# Patient Record
(demographics unavailable — no encounter records)

---

## 2024-10-14 NOTE — HISTORY OF PRESENT ILLNESS
[de-identified] : c/o problems with nosebleeds - problem for about 3 years - worse in fall.   Last episode one week ago.  usually right side.   Occ heavy.  Hx thalassemia minor.  No ASA of blood thinners.  (Beta thalaseemia )

## 2024-10-14 NOTE — REVIEW OF SYSTEMS
[As Noted in HPI] : as noted in HPI [Nose Bleeds] : nose bleeds [Nasal Congestion] : no nasal congestion [Recurrent Sinus Infections] : no recurrent sinus infections [Problem Snoring] : no snoring problems [Sinus Pain] : no sinus pain [Sinus Pressure] : no sinus pressure [Sense Of Smell Problem] : no sense of smell problem [Discolored Nasal Discharge] : no discolored nasal discharge [Snoring With Pauses] : no snoring with pauses [de-identified] : nose bleeds

## 2024-10-14 NOTE — REASON FOR VISIT
[Initial Consultation] : an initial consultation for [Parent] : parent [Other: _____] : [unfilled] [FreeTextEntry2] : nose bleeds

## 2024-10-14 NOTE — ASSESSMENT
[FreeTextEntry1] : Patient with occ epistaxis - problem for about 3 years - last episode one week ago.  Usually right but can be either.  No bleeding site seen but does have nasal crusting. Recommended mupirocin ointment- instructed in use - also saline nasal spray and cool mist humidifier. follow up as needed.

## 2024-10-14 NOTE — CONSULT LETTER
[Dear  ___] : Dear  [unfilled], [Consult Letter:] : I had the pleasure of evaluating your patient, [unfilled]. [Please see my note below.] : Please see my note below. [Consult Closing:] : Thank you very much for allowing me to participate in the care of this patient.  If you have any questions, please do not hesitate to contact me. [FreeTextEntry3] : Sincerely,  Rojelio Avila MD., FACS

## 2025-02-18 NOTE — HISTORY OF PRESENT ILLNESS
[No Feeding Issues] : no feeding issues at this time [Solid Foods] : eating solid foods [de-identified] : Uriel Alaniz (: 2012) is an 12-year-old male patient with PMH significant for murmur and atrial premature complex (Following with Cardiology since 2013) presenting to the clinic today for concerns related to Beta Thalassemia Minor. On 2022, Uriel's CBC displayed an elevated RBC count to 5.88 (Elevated), therapeutic HGB of 11.6 g/dl, RBC microcytosis with an MCV of 64.1, MCH of 19.7 and RDW of 17.8. HGB electrophoresis confirmed Beta Thalassemia Minor. Iron studies not performed at this time, but lead level displayed as therapeutic. Uriel's PCP is Kamilah Larsen.  [de-identified] : Family denies fevers, URI symptoms, N/V/D, constipation, neurological concerns, active bleeding, active jaundice, ecchymosis, and petechiae. Adequate intake and output.  Adequate activity level with no noted signs of increased lethargy or fatigue. No noted signs of pain or distress.   [de-identified] : Cow's milk only in cereal

## 2025-02-18 NOTE — REASON FOR VISIT
[Follow-Up Visit] : a follow-up visit for [Anemia] : anemia [Family Member] : family member [Patient] : patient [Mother] : mother [Medical Records] : medical records [Other: _____] : [unfilled] [FreeTextEntry2] : Beta Thalassemia Minor

## 2025-02-18 NOTE — PHYSICAL EXAM
[PERRLA] : SELENA [Normal gait] : normal gait  [Normal] : affect appropriate [de-identified] : No murmurs auscultated

## 2025-02-18 NOTE — SOCIAL HISTORY
[Mother] : mother [Father] : father [___ Brothers] : [unfilled] brothers [___ Sisters] : [unfilled] sisters [Grade:  _____] : Grade: [unfilled] [Secondhand Smoke] : no exposure to  secondhand smoke [de-identified] : No pets  [FreeTextEntry2] : Teacher  [FreeTextEntry3] :

## 2025-02-18 NOTE — PAST MEDICAL HISTORY
[At Term] : at term [place] [Normal Vaginal Route] : by normal vaginal route [None] : there were no delivery complications [NICU] : NICU [Age Appropriate] : age appropriate  [In Vitro Fertilization] : Pregnancy no in vitro fertilization [Jaundice] : not jaundice [Phototherapy] : no phototherapy [Transfusion] : no transfusion [Exchange Transfusion] : no exchange transfusion [FreeTextEntry5] : N/A

## 2025-02-18 NOTE — FAMILY HISTORY
[] :  [Age ___] : Age: [unfilled] [Healthy] : healthy [Full] : full sister [FreeTextEntry2] : Beta Thalassemia Minor  [de-identified] : Hyperlipidemia

## 2025-06-19 NOTE — SOCIAL HISTORY
[Mother] : mother [Father] : father [___ Brothers] : [unfilled] brothers [___ Sisters] : [unfilled] sisters [Grade:  _____] : Grade: [unfilled] [Secondhand Smoke] : no exposure to  secondhand smoke [de-identified] : No pets  [FreeTextEntry2] : Teacher  [FreeTextEntry3] :

## 2025-06-19 NOTE — PHYSICAL EXAM
[PERRLA] : SELENA [Normal gait] : normal gait  [Normal] : affect appropriate [de-identified] : No murmurs auscultated

## 2025-06-19 NOTE — HISTORY OF PRESENT ILLNESS
[No Feeding Issues] : no feeding issues at this time [Solid Foods] : eating solid foods [de-identified] : Uriel Alaniz (: 2012) is an 12-year-old male patient with PMH significant for murmur and atrial premature complex (Following with Cardiology since 2013) presenting to the clinic today for concerns related to Beta Thalassemia Minor. On 2022, Uriel's CBC displayed an elevated RBC count to 5.88 (Elevated), therapeutic HGB of 11.6 g/dl, RBC microcytosis with an MCV of 64.1, MCH of 19.7 and RDW of 17.8. HGB electrophoresis confirmed Beta Thalassemia Minor. Iron studies not performed at this time, but lead level displayed as therapeutic. Uriel's PCP is Kamilah Larsen.  [de-identified] : Family denies fevers, URI symptoms, N/V/D, constipation, neurological concerns, active jaundice, ecchymosis, and petechiae. Adequate intake and output.  Adequate activity level with no noted signs of increased lethargy or fatigue. No noted signs of pain or distress. Uriel repots consistent epistaxis. Seen by ENT. Presents today for iron studies.    [de-identified] : Cow's milk only in cereal

## 2025-06-19 NOTE — FAMILY HISTORY
[] :  [Age ___] : Age: [unfilled] [Healthy] : healthy [Full] : full sister [FreeTextEntry2] : Beta Thalassemia Minor  [de-identified] : Hyperlipidemia

## 2025-07-14 NOTE — REASON FOR VISIT
[Subsequent Evaluation] : a subsequent evaluation for [Epistaxis] : epistaxis [Parent] : parent [Family Member] : family member [Other: _____] : [unfilled]

## 2025-07-14 NOTE — REVIEW OF SYSTEMS
[Nose Bleeds] : nose bleeds [Negative] : Heme/Lymph [de-identified] : right nostril bleeding ongoing , lasting 10 min

## 2025-07-14 NOTE — ASSESSMENT
[FreeTextEntry1] : Patient with right epistaxis - had vessel right anterior septum - cauterized with AgNO3 and packed with gelfoam - recommended reduced activity, no noseblowing and sneeze with mouth openfor 5 days then start nasal saline spray 3x a day.  Also cool diet for next few days and cool mist bedside humidifier - followup as needed

## 2025-07-14 NOTE — HISTORY OF PRESENT ILLNESS
[de-identified] : Has had nosebleeds again - started again one month ago.  Last nosebleed yesterday - occ several x /day.  Noted from right side.